# Patient Record
Sex: FEMALE | Race: WHITE
[De-identification: names, ages, dates, MRNs, and addresses within clinical notes are randomized per-mention and may not be internally consistent; named-entity substitution may affect disease eponyms.]

---

## 2019-04-28 ENCOUNTER — HOSPITAL ENCOUNTER (EMERGENCY)
Dept: HOSPITAL 41 - JD.ED | Age: 84
Discharge: HOME | End: 2019-04-28
Payer: MEDICARE

## 2019-04-28 VITALS — DIASTOLIC BLOOD PRESSURE: 80 MMHG | SYSTOLIC BLOOD PRESSURE: 141 MMHG

## 2019-04-28 DIAGNOSIS — E03.9: ICD-10-CM

## 2019-04-28 DIAGNOSIS — Z79.899: ICD-10-CM

## 2019-04-28 DIAGNOSIS — H81.10: ICD-10-CM

## 2019-04-28 DIAGNOSIS — R55: Primary | ICD-10-CM

## 2019-04-28 DIAGNOSIS — Z88.8: ICD-10-CM

## 2019-04-28 PROCEDURE — 96374 THER/PROPH/DIAG INJ IV PUSH: CPT

## 2019-04-28 PROCEDURE — 85007 BL SMEAR W/DIFF WBC COUNT: CPT

## 2019-04-28 PROCEDURE — 93005 ELECTROCARDIOGRAM TRACING: CPT

## 2019-04-28 PROCEDURE — 83735 ASSAY OF MAGNESIUM: CPT

## 2019-04-28 PROCEDURE — 80053 COMPREHEN METABOLIC PANEL: CPT

## 2019-04-28 PROCEDURE — 81001 URINALYSIS AUTO W/SCOPE: CPT

## 2019-04-28 PROCEDURE — 85027 COMPLETE CBC AUTOMATED: CPT

## 2019-04-28 PROCEDURE — 99284 EMERGENCY DEPT VISIT MOD MDM: CPT

## 2019-04-28 PROCEDURE — 36415 COLL VENOUS BLD VENIPUNCTURE: CPT

## 2019-04-28 PROCEDURE — 84484 ASSAY OF TROPONIN QUANT: CPT

## 2019-04-28 NOTE — EDM.PDOC
ED HPI GENERAL MEDICAL PROBLEM





- General


Chief Complaint: Neurological Problem


Stated Complaint: DIZZY/UNRESPONSIVE


Time Seen by Provider: 19 08:29


Source of Information: Reports: Patient, Family (2 daughters), RN Notes Reviewed


History Limitations: Reports: Other (Patient is a poor historian)





- History of Present Illness


INITIAL COMMENTS - FREE TEXT/NARRATIVE: 





The patient is a poor historian. According to the patient's daughters, the 

patient has suffered from dizziness on and off for the past 40 years. When 

asked if, by dizziness, the patient means lightheadedness or that the room is 

spinning, the patient responded lightheadedness, but she also states that she 

does not feel like she is going to pass out, and the patient's daughters stated 

that the patient often calls out that the room is spinning. When asked about 

this, the patient agreed, that when she feels dizzy, the room is spinning. She 

saw an ENT about 2 years ago, and was apparently referred to physical therapy, 

but did not go. She has been prescribed meclizine, which she has taken in the 

past, although did not take today.





According to the patient's daughters, the 3 of them were heading to [place], and 

on their way there, the patient told them that she was feeling dizzy. Around 

the time they arrived to [place], the patient became unresponsive for about 1 

minute, making a gurgling sound. According to the patient's daughters, this 

episode of unresponsiveness is new. From the patient's perspective, she recalls 

going to [place], then the next thing she recalls is her daughter's telling her 

that they were going to the ER.





Here in the ED, the patient denies feeling dizzy, but is complaining of 

considerable nausea.





After my initial history and physical exam, the patient's daughters pulled me 

aside to tell me that the patient has been taking CBD oil, that apparently 

contains THC, for chronic back pain, for the past month. She was given the oil 

by a friend of hers who . The patient did not want me to know that 

information.





The patient's PCP is Dr. Barbara Hunter.











- Related Data


 Allergies











Allergy/AdvReac Type Severity Reaction Status Date / Time


 


prednisone Allergy  Cannot Verified 19 08:38





   Remember  











Home Meds: 


 Home Meds





Levothyroxine 25 mcg PO DAILY 06/01/18 [History]


Rosuvastatin [Crestor] 10 mg PO DAILY 18 [History]


Ubidecarenone [Coq-10] 100 mg PO DAILY 18 [History]


hydroCHLOROthiazide [Hydrochlorothiazide] 25 mg PO DAILY 18 [History]


Acetaminophen [Tylenol] 650 mg PO Q4HR PRN 19 [History]


Cholecalciferol (Vitamin D3) [Vitamin D3] 1,000 mg PO DAILY 19 [History]


Methocarbamol 500 mg PO DAILY 19 [History]


Ondansetron [Zofran ODT] 1 tab PO Q8H PRN #10 tab.dis 19 [Rx]


Tumeric 500 mg PO DAILY 19 [History]











Past Medical History


HEENT History: Reports: Other (See Below) (BPPV)


Cardiovascular History: Reports: High Cholesterol, Hypertension


OB/GYN History: Reports: Pregnancy


Musculoskeletal History: Reports: Arthritis, Back Pain, Chronic


Endocrine/Metabolic History: Reports: Hypothyroidism


Oncologic (Cancer) History: Reports: Breast (left), Colon





- Past Surgical History


HEENT Surgical History: Reports: Cataract Surgery, Tonsillectomy


Musculoskeletal Surgical History: Reports: Arthroscopic Procedure


Oncologic Surgical History: Reports: Lumpectomy (left), Other (See Below) (

Hemicolectomy)





Social & Family History





- Family History


Family Medical History: Noncontributory





- Tobacco Use


Smoking Status *Q: Never Smoker


Second Hand Smoke Exposure: No





- Caffeine Use


Caffeine Use: Reports: Coffee





- Alcohol Use


Alcohol Use History: No





- Recreational Drug Use


Recreational Drug Use: No





- Living Situation & Occupation


Living situation: Reports: , Alone ( is in a nursing home)


Occupation: Retired





ED ROS GENERAL





- Review of Systems


Review Of Systems: ROS reveals no pertinent complaints other than HPI.





- Physical Exam


Exam: See Below


Exam Limited By: No Limitations


General Appearance: Alert, WD/WN, Mild Distress (nauseated)


Eye Exam: Bilateral Eye: EOMI, Normal Inspection


Ears: Normal External Exam, Hearing Grossly Normal


Nose: Normal Inspection


Throat/Mouth: Normal Inspection, Normal Lips, Normal Voice, No Airway Compromise


Head Exam: Atraumatic, Normocephalic


Neck: Normal Inspection, Full Range of Motion


Respiratory/Chest: No Respiratory Distress, Lungs Clear, Normal Breath Sounds, 

No Accessory Muscle Use


Cardiovascular: Normal Peripheral Pulses, Regular Rate, Rhythm, No Gallop, No 

JVD, No Murmur, No Rub


GI/Abdominal: Normal Bowel Sounds, Soft, Non-Tender, No Organomegaly, No 

Distention, No Abnormal Bruit, No Mass


 (Female) Exam: Deferred


Rectal (Female) Exam: Deferred


Neuro Exam (Abbreviated): Alert, Oriented, CN II-XII Intact, No Motor/Sensory 

Deficits


Back Exam: Normal Inspection, Full Range of Motion, NT


Extremities: Normal Inspection, Normal Range of Motion, Normal Capillary Refill


Psychiatric: Normal Affect


Skin Exam: Warm, Dry, Intact, Normal Color, No Rash





EKG INTERPRETATION


EKG Date: 19


Time: 09:36


Rhythm: NSR


Rate (Beats/Min): 71


Axis: Normal


P-Wave: Present


QRS: Normal (Early transition)


ST-T: Normal


QT: Normal


Comparison: NA - No Prior EKG





Course





- Vital Signs


Last Recorded V/S: 


 Last Vital Signs











Temp  36.5 C   19 08:28


 


Pulse  84   19 08:28


 


Resp  15   19 08:28


 


BP  141/80 H  19 08:28


 


Pulse Ox  95   19 08:28








 





Orthostatic Blood Pressure [     150/74


Standing]                        


Orthostatic Blood Pressure [     143/71


Sitting]                         


Orthostatic Blood Pressure [     131/58


Supine]                          











- Orders/Labs/Meds


Orders: 


 Active Orders 24 hr











 Category Date Time Status


 


 EKG Documentation Completion [RC] STAT Care  19 09:09 Active


 


 Orthostatic Vital Signs [RC] STAT Care  19 09:08 Active


 


 Head wo Cont [CT] Stat Exams  19 10:54 Stop Req











Labs: 


 Laboratory Tests











  19 Range/Units





  09:20 09:20 09:45 


 


WBC  7.45    (3.98-10.04)  K/mm3


 


RBC  3.74 L    (3.98-5.22)  M/mm3


 


Hgb  11.9    (11.2-15.7)  gm/L


 


Hct  36.7    (34.1-44.9)  %


 


MCV  98.1 H    (79.4-94.8)  fl


 


MCH  31.8    (25.6-32.2)  pg


 


MCHC  32.4    (32.2-35.5)  g/dl


 


RDW Std Deviation  47.7 H    (36.4-46.3)  fL


 


Plt Count  206    (182-369)  K/mm3


 


MPV  8.5 L    (9.4-12.3)  fl


 


Neutrophils % (Manual)  59    (40-60)  %


 


Band Neutrophils %  0    (0-10)  %


 


Lymphocytes % (Manual)  33    (20-40)  %


 


Atypical Lymphs %  0    %


 


Monocytes % (Manual)  8    (2-10)  %


 


Eosinophils % (Manual)  0 L    (0.7-5.8)  %


 


Basophils % (Manual)  0 L    (0.1-1.2)  


 


Platelet Estimate  Adequate    


 


Hypochromasia  1+ slight    


 


RBC Morph Comment  Abnormal    


 


Sodium   141   (136-145)  mEq/L


 


Potassium   4.1   (3.5-5.1)  mEq/L


 


Chloride   101   ()  mEq/L


 


Carbon Dioxide   29   (21-32)  mEq/L


 


Anion Gap   15.1 H   (5-15)  


 


BUN   32 H   (7-18)  mg/dL


 


Creatinine   1.1 H   (0.55-1.02)  mg/dL


 


Est Cr Clr Drug Dosing   26.86   mL/min


 


Estimated GFR (MDRD)   47   (>60)  mL/min


 


BUN/Creatinine Ratio   29.1 H   (14-18)  


 


Glucose   135 H   ()  mg/dL


 


Calcium   9.3   (8.5-10.1)  mg/dL


 


Magnesium   1.8   (1.8-2.4)  mg/dl


 


Total Bilirubin   0.3   (0.2-1.0)  mg/dL


 


AST   22   (15-37)  U/L


 


ALT   28   (14-59)  U/L


 


Alkaline Phosphatase   55   ()  U/L


 


Troponin I   < 0.017   (0.00-0.056)  ng/mL


 


Total Protein   7.2   (6.4-8.2)  g/dl


 


Albumin   3.6   (3.4-5.0)  g/dl


 


Globulin   3.6   gm/dL


 


Albumin/Globulin Ratio   1.0   (1-2)  


 


Urine Color    Yellow  (Yellow)  


 


Urine Appearance    Clear  (Clear)  


 


Urine pH    6.5  (5.0-8.0)  


 


Ur Specific Gravity    1.020  (1.005-1.030)  


 


Urine Protein    Trace H  (Negative)  


 


Urine Glucose (UA)    Negative  (Negative)  


 


Urine Ketones    Negative  (Negative)  


 


Urine Occult Blood    Negative  (Negative)  


 


Urine Nitrite    Negative  (Negative)  


 


Urine Bilirubin    Negative  (Negative)  


 


Urine Urobilinogen    0.2  (0.2-1.0)  


 


Ur Leukocyte Esterase    Negative  (Negative)  


 


Urine RBC    0-5  (0-5)  /hpf


 


Urine WBC    0-5  (0-5)  /hpf


 


Ur Epithelial Cells    0-5  (0-5)  /hpf


 


Urine Bacteria    Few H  (FEW)  /hpf


 


Hyaline Casts    5-10 H  (0-5)  /lpf


 


Urine Mucus    Few  (FEW)  /hpf











Meds: 


Medications














Discontinued Medications














Generic Name Dose Route Start Last Admin





  Trade Name Freq  PRN Reason Stop Dose Admin


 


Ondansetron HCl  4 mg  19 08:51  19 09:23





  Zofran  IVPUSH  19 08:52  4 mg





  ONETIME ONE   Administration





     





     





     





     














- Re-Assessments/Exams


Free Text/Narrative Re-Assessment/Exam: 





19 09:19


Based on the patient's history, I suspect that the episode of syncope that she 

suffered earlier was vagal in reaction, likely related to vertigo that she was 

experiencing while in the car. Her neurologic exam here in the ED is completely 

normal, therefore an emergency CT scan of the head is not indicated. I have 

ordered orthostatics, blood work, a urinalysis, and an ECG.








19 10:03


The patient is not orthostatic, and I am told by The RN that the patient 

reported no change in her dizziness between lying and standing.








19 10:50


Test results discussed with the patient and her family. Today's workup is 

entirely unremarkable. Based on her history, physical exam, and ED tests, I 

suspect the patient suffered a vasovagal reaction related to her vertigo and 

nausea, although I explained that I cannot prove that, since her pulse was not 

checked at the time of her syncopal episode. Given her age, I offered to place 

the patient into observation, to monitor on telemetry. Initially, the patient 

declined, but her family talked her into it.





Case then discussed with Dr. Hughes at 10:48. He agreed to place the patient 

into observation, but wants the patient to undergo a CT scan of her head 

without contrast prior to her going to the floor. He is aware that the patient'

s neurologic exam is normal.








19 11:08


The patient and her family changed their minds. They would prefer to have her 

go home. I will prescribe Zofran ODT. The patient already has meclizine at home.





Departure





- Departure


Time of Disposition: 11:09


Disposition: Home, Self-Care 01


Condition: Good


Clinical Impression: 


 Vasovagal syncope, BPPV (benign paroxysmal positional vertigo)








- Discharge Information


*PRESCRIPTION DRUG MONITORING PROGRAM REVIEWED*: Not Applicable


*COPY OF PRESCRIPTION DRUG MONITORING REPORT IN PATIENT MYRANDA: Not Applicable


Prescriptions: 


Ondansetron [Zofran ODT] 1 tab PO Q8H PRN #10 tab.dis


 PRN Reason: Nausea/Vomiting


Instructions:  Syncope, Easy-to-Read


Referrals: 


Barbara Hunter MD [Primary Care Provider] - 


Additional Instructions: 


You were seen in the emergency room after passing out, after feeling dizzy and 

nauseated.





Workup in the ER included blood work, a urinalysis, positional blood pressure 

checks, and an ECG.





Your entire workup was unremarkable. You are not dehydrated. You are not 

anemic. No significant electrolyte abnormalities were found. You have not 

suffered a heart attack. You do not have a urinary tract infection. No abnormal 

heart rhythms were found.





Based on your history, physical exam, and ER tests, the cause of your passing 

out was most likely due to a vasovagal reaction = slowing of your heart rate 

due to dizziness and nausea.





Placement into observation was offered, but declined.





Take your meclizine, one tablet up to every 8 hours, as needed for dizziness.





A prescription for the anti-nausea medicine Zofran has been sent to the [facility] located just south and across the [address] from [facility]. Dissolve 

one tablet of Zofran on your tongue up to every 8 hours, as needed for nausea/

vomiting.





Follow-up with your PCP, Dr. Barbara Hunter, at your previously scheduled 

appointment this coming Tuesday, 2019.





If any other problems, please do not hesitate to return to the ER.





- My Orders


Last 24 Hours: 


My Active Orders





19 09:08


Orthostatic Vital Signs [RC] STAT 





19 09:09


EKG Documentation Completion [RC] STAT 





19 10:54


Head wo Cont [CT] Stat 














- Assessment/Plan


Last 24 Hours: 


My Active Orders





19 09:08


Orthostatic Vital Signs [RC] STAT 





19 09:09


EKG Documentation Completion [RC] STAT 





19 10:54


Head wo Cont [CT] Stat